# Patient Record
Sex: MALE
[De-identification: names, ages, dates, MRNs, and addresses within clinical notes are randomized per-mention and may not be internally consistent; named-entity substitution may affect disease eponyms.]

---

## 2018-07-10 ENCOUNTER — HOSPITAL ENCOUNTER (OUTPATIENT)
Dept: HOSPITAL 31 - C.SDS | Age: 58
Setting detail: OBSERVATION
LOS: 1 days | Discharge: HOME | End: 2018-07-11
Attending: UROLOGY | Admitting: UROLOGY
Payer: COMMERCIAL

## 2018-07-10 VITALS — BODY MASS INDEX: 25 KG/M2

## 2018-07-10 DIAGNOSIS — R33.9: Primary | ICD-10-CM

## 2018-07-10 DIAGNOSIS — R31.9: ICD-10-CM

## 2018-07-10 PROCEDURE — 36415 COLL VENOUS BLD VENIPUNCTURE: CPT

## 2018-07-10 PROCEDURE — 85027 COMPLETE CBC AUTOMATED: CPT

## 2018-07-10 PROCEDURE — 82948 REAGENT STRIP/BLOOD GLUCOSE: CPT

## 2018-07-10 PROCEDURE — 80053 COMPREHEN METABOLIC PANEL: CPT

## 2018-07-10 PROCEDURE — 52648 LASER SURGERY OF PROSTATE: CPT

## 2018-07-10 RX ADMIN — CIPROFLOXACIN SCH MLS: 2 INJECTION, SOLUTION INTRAVENOUS at 19:00

## 2018-07-10 RX ADMIN — HYDROMORPHONE HYDROCHLORIDE PRN MG: 1 INJECTION, SOLUTION INTRAMUSCULAR; INTRAVENOUS; SUBCUTANEOUS at 18:25

## 2018-07-10 RX ADMIN — HYDROMORPHONE HYDROCHLORIDE PRN MG: 1 INJECTION, SOLUTION INTRAMUSCULAR; INTRAVENOUS; SUBCUTANEOUS at 18:00

## 2018-07-11 VITALS — HEART RATE: 74 BPM | DIASTOLIC BLOOD PRESSURE: 79 MMHG | SYSTOLIC BLOOD PRESSURE: 129 MMHG

## 2018-07-11 VITALS — RESPIRATION RATE: 20 BRPM | TEMPERATURE: 98.1 F | OXYGEN SATURATION: 97 %

## 2018-07-11 LAB
ALBUMIN SERPL-MCNC: 4.1 G/DL (ref 3.5–5)
ALBUMIN/GLOB SERPL: 1.5 {RATIO} (ref 1–2.1)
ALT SERPL-CCNC: 26 U/L (ref 21–72)
AST SERPL-CCNC: 24 U/L (ref 17–59)
BUN SERPL-MCNC: 14 MG/DL (ref 9–20)
CALCIUM SERPL-MCNC: 9.4 MG/DL (ref 8.6–10.4)
ERYTHROCYTE [DISTWIDTH] IN BLOOD BY AUTOMATED COUNT: 15 % (ref 11.5–14.5)
GFR NON-AFRICAN AMERICAN: > 60
HGB BLD-MCNC: 13.2 G/DL (ref 12–18)
MCH RBC QN AUTO: 33.4 PG (ref 27–31)
MCHC RBC AUTO-ENTMCNC: 34.2 G/DL (ref 33–37)
MCV RBC AUTO: 97.6 FL (ref 80–94)
PLATELET # BLD: 242 K/UL (ref 130–400)
PMV BLD AUTO: 8.5 FL (ref 7.2–11.7)
RBC # BLD AUTO: 3.94 MIL/UL (ref 4.4–5.9)
WBC # BLD AUTO: 9.2 K/UL (ref 4.8–10.8)

## 2018-07-11 RX ADMIN — CIPROFLOXACIN SCH MLS/HR: 2 INJECTION, SOLUTION INTRAVENOUS at 06:31

## 2018-07-24 NOTE — HP
UROLOGY ADMISSION HISTORY AND PHYSICAL



REASON FOR ADMISSION:  PVP photovaporization, GreenLight laser

transurethral resection of the prostate.



HISTORY OF PRESENT ILLNESS:  Mr. Erickson is a very pleasant gentleman, he

has lot of voiding dysfunction, irritative and obstructive urinary

complaints, particularly more obstructive, incomplete bladder emptying,

urinary retention.



Despite medical therapy, he is still having trouble, so we discussed the

options with the patient, and he is here today for a PVP photovaporization,

GreenLight laser transurethral resection of prostate.



I have discussed with the patient the risk, the benefits, and the treatment

alternatives.  I have discussed the risk, specifically the expectation for

bladder for retrograde ejaculation.  I discussed also the possibility that

sometimes _____ work well.  I discussed the risk of scarring.  I discussed

the risk of perforation, but _____ I also discussed the benefit _____ 

emptying of the bladder.



After discussing all the options with the patient, and also _____ medical

therapy, other surgical therapy, open surgery, transurethral resection with

electrocautery.



After discussing those options, we also discussed _____ .  He is here today

for photovaporization GreenLight laser TURP.



PAST MEDICAL AND SURGICAL HISTORY:  As listed on the chart, medical

clearance was obtained.



No history of an MI, CVA.



MEDICATIONS:  As per chart.



ALLERGIES:  AS PER CHART.



REVIEW OF SYSTEMS:   As listed above.   No weight loss, chest pain,

constitutional complaints, night sweats, etc.



PHYSICAL EXAMINATION:

GENERAL:  A well-nourished male, in no apparent distress.

VITAL SIGNS:  Within normal limits, included in the chart.

HEENT:  Sclerae nonicteric and not injected.  No oral thrush.

NECK:  No cervical or axillary lymphadenopathy.

LUNGS:  Clear.

HEART:  Normal S1 and S2.

ABDOMEN:  Overall, soft, nontender.  No flank mass appreciated.

GENITALIA:  Normal phallus without discharge.  No testicular masses.

RECTAL:  A 30 to 40 gm prostate, soft and smooth.



DIAGNOSES:  Urinary retention, voiding dysfunction, incomplete bladder

emptying.



PLAN:  As follows:  We are going to recommend the followin.  Antibiotic prophylaxis.

2.  A photovaporization GreenLight laser transurethral resection of the

prostate.  We have made arrangements for the _____ available for the laser.



Further plans will follow.





__________________________________________

Andreas Armendariz MD





DD:  2018 8:56:47

DT:  2018 16:04:39

[facility] # [id number]

## 2018-07-24 NOTE — OP
PROCEDURE DATE:  07/10/2018



PREOPERATIVE DIAGNOSES:  Voiding dysfunction, obstructive urinary

complaints, irritative urinary complaints, incomplete bladder emptying.



POSTOPERATIVE DIAGNOSES:  Voiding dysfunction, obstructive urinary

complaints, irritative urinary complaints, incomplete bladder emptying.



PROCEDURE:  Photovaporization GreenLight laser transurethral resection of

the prostate.



ESTIMATED BLOOD LOSS:  Less than 25 mL.



SURGEON:  Andreas Esteves MD



SPECIMEN:  There were no specimen.



COMPLICATIONS:  There were no complications.



DRAINS:  Sargent catheter via the urethra. Left with mild fracture.



FINDINGS:  Normal intrauterine.  No strictures.  The urine is visually

occlusive preop.  Postop, the patient is nicely wide open.



I want to mention also that we inspected the urethral bladder very

carefully.



The prostatic veru was identified pre and postop and intact.



The ureteral wall was identified and intact preop and postop.



All bleeders were extracted preop and postop.



There were no complication at the termination of the procedure.  The

patient is nicely opened with excellent nice pictures that were

demonstrated.



INDICATIONS:  See the history and physical for further details.  This is a

very pleasant gentleman, who is here for the above procedure.  We have

discussed options, risks, benefits, and alternatives.  Specifically, we

discussed the expectation of retrograde ejaculation and probability that

this is a permanent long term effect.



DESCRIPTION OF PROCEDURE:  After obtaining informed consent, the patient

was placed on the table.  Routine monitors were placed.  Time-out was

called.  We confirmed the patient positioning.



Antibiotic prophylaxis used.



With the visual obturator, we introduced the 22-German continuous flow

scope.



We now identified all our landmarks.  The ureteral orifice was identified. 

The verumontanum was identified.



We now began with the laser, we began at 80 avila of energy.



We started the bladder neck at 5 to 7 slowly, carefully meticulously we

went between 5 to 7 worked our on the floor.



We eventually turned our attention to 7 to 11 and then 5 to 1.



We work away from the proximal to distal end.



We will keep inspecting  where we are up to and we have worked

symmetrically.



We turned our attention towards the veru, we got between the 11 and 1 o'

clock position.



We can achieve hemostasis at each step and along the way.



They are of very good control today.



It is nicely wide open.  Pictures were taken in place.



We have turned off all the water and irrigation.  We have seen they were of

good control.  There is no bleeding.



Everything  ______ intact to 0.



The patient was nicely opened.



At this point, we inserted Sargent catheter via the urethra.  We put about 50

to 60 cc in the balloon and was able to traction.  The patient tolerated

the procedure well without complication.





__________________________________________

Andreas Armendariz MD



DD:  07/23/2018 8:59:44

DT:  07/23/2018 17:40:45

[facility] # [id number]